# Patient Record
Sex: FEMALE | Race: WHITE | NOT HISPANIC OR LATINO | Employment: FULL TIME | URBAN - METROPOLITAN AREA
[De-identification: names, ages, dates, MRNs, and addresses within clinical notes are randomized per-mention and may not be internally consistent; named-entity substitution may affect disease eponyms.]

---

## 2023-06-16 ENCOUNTER — OFFICE VISIT (OUTPATIENT)
Dept: URGENT CARE | Facility: CLINIC | Age: 48
End: 2023-06-16
Payer: COMMERCIAL

## 2023-06-16 VITALS
BODY MASS INDEX: 24.91 KG/M2 | HEIGHT: 66 IN | DIASTOLIC BLOOD PRESSURE: 79 MMHG | SYSTOLIC BLOOD PRESSURE: 137 MMHG | RESPIRATION RATE: 18 BRPM | TEMPERATURE: 99 F | HEART RATE: 70 BPM | WEIGHT: 155 LBS | OXYGEN SATURATION: 99 %

## 2023-06-16 DIAGNOSIS — J06.9 VIRAL URI WITH COUGH: Primary | ICD-10-CM

## 2023-06-16 DIAGNOSIS — H93.8X3 SENSATION OF FULLNESS IN BOTH EARS: ICD-10-CM

## 2023-06-16 DIAGNOSIS — R09.81 NASAL CONGESTION: ICD-10-CM

## 2023-06-16 PROCEDURE — 99203 OFFICE O/P NEW LOW 30 MIN: CPT | Mod: S$GLB,,, | Performed by: NURSE PRACTITIONER

## 2023-06-16 PROCEDURE — 99203 PR OFFICE/OUTPT VISIT, NEW, LEVL III, 30-44 MIN: ICD-10-PCS | Mod: S$GLB,,, | Performed by: NURSE PRACTITIONER

## 2023-06-16 RX ORDER — IPRATROPIUM BROMIDE 21 UG/1
2 SPRAY, METERED NASAL 3 TIMES DAILY
Qty: 30 ML | Refills: 0 | Status: SHIPPED | OUTPATIENT
Start: 2023-06-16 | End: 2023-06-23

## 2023-06-16 NOTE — PROGRESS NOTES
"Subjective:      Patient ID: Solange Ferrari is a 48 y.o. female.    Vitals:  height is 5' 6" (1.676 m) and weight is 70.3 kg (155 lb). Her oral temperature is 98.5 °F (36.9 °C). Her blood pressure is 137/79 and her pulse is 70. Her respiration is 18 and oxygen saturation is 99%.     Chief Complaint: Nasal Congestion (Fever and vomiting on last Saturday. Patient has severe sinus congestion now.)    48 year old female presents for evaluation of post nasal drip and sinus pressure since Monday. Reports H/o seasonal allergies. Recent stomach virus on Saturday, resolved after 1 day. Fever, body aches, and headache on Sunday. Cold symptoms started on Monday. C/O fatigue: working 14 hours daily. Recent air travel last week and positive sick contacts at work. OTC Sudaphed with mild relief        Sinusitis  This is a new problem. The current episode started in the past 7 days. The problem has been gradually worsening since onset. The maximum temperature recorded prior to her arrival was 100.4 - 100.9 F. The fever has been present for 1 to 2 days. Her pain is at a severity of 4/10. The pain is moderate. Associated symptoms include congestion, coughing, ear pain (right ear pressure), headaches and sinus pressure. Pertinent negatives include no chills, diaphoresis, hoarse voice, neck pain, shortness of breath, sneezing, sore throat or swollen glands. Treatments tried: sudafed. The treatment provided no relief.     Constitution: Positive for fatigue. Negative for appetite change, chills, sweating and fever.   HENT:  Positive for ear pain (right ear pressure), congestion, postnasal drip and sinus pressure. Negative for sinus pain and sore throat.    Neck: neck negative. Negative for neck pain.   Cardiovascular: Negative.    Eyes: Negative.    Respiratory:  Positive for cough and sputum production. Negative for chest tightness, shortness of breath and wheezing.    Gastrointestinal:  Positive for nausea (x 1 day), vomiting (x 1 " day) and diarrhea (x 1 day).   Endocrine: negative.   Genitourinary: Negative.    Musculoskeletal:  Positive for muscle ache.   Skin: Negative.    Allergic/Immunologic: Negative.  Negative for sneezing.   Neurological:  Positive for headaches.   Hematologic/Lymphatic: Negative.    Psychiatric/Behavioral: Negative.      Objective:     Physical Exam   Constitutional: She is oriented to person, place, and time. She is cooperative.  Non-toxic appearance. She does not appear ill. No distress. normalawake  HENT:   Head: Normocephalic and atraumatic.   Ears:   Right Ear: Tympanic membrane, external ear and ear canal normal. No cerumen not present. Tympanic membrane is not injected, not scarred, not perforated, not erythematous, not retracted and not bulging. impacted cerumen  Left Ear: Tympanic membrane, external ear and ear canal normal. No cerumen not present. Tympanic membrane is not injected, not scarred, not perforated, not erythematous, not retracted and not bulging. impacted cerumen  Nose: Congestion present. No mucosal edema, rhinorrhea or purulent discharge. Right sinus exhibits no maxillary sinus tenderness and no frontal sinus tenderness. Left sinus exhibits no maxillary sinus tenderness and no frontal sinus tenderness.   Mouth/Throat: Uvula is midline, oropharynx is clear and moist and mucous membranes are normal. Mucous membranes are moist. No oropharyngeal exudate or posterior oropharyngeal erythema. Tonsils are 0 on the right. Tonsils are 0 on the left. No tonsillar exudate.   Eyes: Conjunctivae are normal. Pupils are equal, round, and reactive to light. Right eye exhibits no discharge. Left eye exhibits no discharge. No scleral icterus. Extraocular movement intact   Neck: Neck supple.   Cardiovascular: Normal rate, regular rhythm and normal heart sounds.   Pulmonary/Chest: Effort normal and breath sounds normal. No stridor. No respiratory distress. She has no decreased breath sounds. She has no wheezes. She  has no rhonchi. She has no rales. She exhibits no tenderness.   Abdominal: Normal appearance.   Musculoskeletal: Normal range of motion.         General: Normal range of motion.   Neurological: no focal deficit. She is alert and oriented to person, place, and time.   Skin: Skin is warm, dry and not diaphoretic.   Psychiatric: Her behavior is normal. Mood, judgment and thought content normal.   Nursing note and vitals reviewed.    Assessment:     1. Viral URI with cough    2. Sensation of fullness in both ears    3. Nasal congestion        Plan:     Patient presents with symptoms that are consistent with viral URI. Exam is negative for bacterial sinusitis, Otitis media, bacterial pharyngitis/tonsillitis, bronchitis, and pneumonia. Plan is to manage symptoms and prevent worsening. Discussed with patient who verbalizes understanding.   Viral URI with cough    Sensation of fullness in both ears    Nasal congestion  -     ipratropium (ATROVENT) 21 mcg (0.03 %) nasal spray; 2 sprays by Each Nostril route 3 (three) times daily. for 7 days  Dispense: 30 mL; Refill: 0                Patient Instructions   Rest  Hydration/increase fluids  Steam/hot showers  Humidifier   Ipratropium bromide 2 sprays each nostril 3 times daily as needed for nasal congestion  Claritin D OTC as needed for nasal congestion  Typical course and duration of viral illness discussed  Signs and symptoms of worsening discussed  Follow up as needed/with worsening

## 2023-06-16 NOTE — PATIENT INSTRUCTIONS
Rest  Hydration/increase fluids  Steam/hot showers  Humidifier   Ipratropium bromide 2 sprays each nostril 3 times daily as needed for nasal congestion  Claritin D OTC as needed for nasal congestion  Typical course and duration of viral illness discussed  Signs and symptoms of worsening discussed  Follow up as needed/with worsening

## 2023-06-19 ENCOUNTER — TELEPHONE (OUTPATIENT)
Dept: URGENT CARE | Facility: CLINIC | Age: 48
End: 2023-06-19
Payer: COMMERCIAL

## 2023-06-19 NOTE — TELEPHONE ENCOUNTER
Gave Ms Ferrari a call to see if she's feeling better. Patient states she is, and I told her I'm glad to hear it and if she needs anything give us a call.

## 2025-03-05 ENCOUNTER — OFFICE VISIT (OUTPATIENT)
Dept: URGENT CARE | Facility: CLINIC | Age: 50
End: 2025-03-05
Payer: COMMERCIAL

## 2025-03-05 VITALS
DIASTOLIC BLOOD PRESSURE: 93 MMHG | HEART RATE: 68 BPM | OXYGEN SATURATION: 100 % | TEMPERATURE: 98 F | SYSTOLIC BLOOD PRESSURE: 161 MMHG | RESPIRATION RATE: 18 BRPM

## 2025-03-05 DIAGNOSIS — R05.9 COUGH, UNSPECIFIED TYPE: ICD-10-CM

## 2025-03-05 DIAGNOSIS — B96.89 BACTERIAL URI: Primary | ICD-10-CM

## 2025-03-05 DIAGNOSIS — J06.9 BACTERIAL URI: Primary | ICD-10-CM

## 2025-03-05 PROCEDURE — 99214 OFFICE O/P EST MOD 30 MIN: CPT | Mod: S$GLB,,, | Performed by: PHYSICIAN ASSISTANT

## 2025-03-05 RX ORDER — BROMPHENIRAMINE MALEATE, PSEUDOEPHEDRINE HYDROCHLORIDE, AND DEXTROMETHORPHAN HYDROBROMIDE 2; 30; 10 MG/5ML; MG/5ML; MG/5ML
10 SYRUP ORAL EVERY 6 HOURS PRN
Qty: 118 ML | Refills: 0 | Status: SHIPPED | OUTPATIENT
Start: 2025-03-05 | End: 2025-03-15

## 2025-03-05 RX ORDER — AMLODIPINE BESYLATE 10 MG/1
10 TABLET ORAL
COMMUNITY
Start: 2025-01-24

## 2025-03-05 RX ORDER — METHYLPREDNISOLONE 4 MG/1
TABLET ORAL
Qty: 21 EACH | Refills: 0 | Status: SHIPPED | OUTPATIENT
Start: 2025-03-05 | End: 2025-03-26

## 2025-03-05 RX ORDER — DEXTROAMPHETAMINE SACCHARATE, AMPHETAMINE ASPARTATE MONOHYDRATE, DEXTROAMPHETAMINE SULFATE AND AMPHETAMINE SULFATE 3.75; 3.75; 3.75; 3.75 MG/1; MG/1; MG/1; MG/1
CAPSULE, EXTENDED RELEASE ORAL EVERY MORNING
COMMUNITY
Start: 2025-01-13

## 2025-03-05 RX ORDER — DOXYCYCLINE 100 MG/1
100 CAPSULE ORAL 2 TIMES DAILY
Qty: 14 CAPSULE | Refills: 0 | Status: SHIPPED | OUTPATIENT
Start: 2025-03-05 | End: 2025-03-12

## 2025-03-05 NOTE — PROGRESS NOTES
Subjective:      Patient ID: Solange Ferrari is a 49 y.o. female.    Vitals:  oral temperature is 97.5 °F (36.4 °C). Her blood pressure is 161/93 (abnormal) and her pulse is 68. Her respiration is 18 and oxygen saturation is 100%.     Chief Complaint: Nasal Congestion    C/o nasal and chest congestion, x 15 days, denies any pain, pt states she has been around others with similar symptoms, pt has taken OTC meds with no relief     Other  This is a new problem. The current episode started 1 to 4 weeks ago. The problem occurs constantly. The problem has been unchanged. Associated symptoms include congestion and coughing. Pertinent negatives include no abdominal pain, anorexia, arthralgias, change in bowel habit, chest pain, chills, diaphoresis, fatigue, fever, headaches, joint swelling, myalgias, nausea, neck pain, numbness, rash, sore throat, swollen glands, urinary symptoms, vertigo, visual change, vomiting or weakness. Nothing aggravates the symptoms. Treatments tried: OTC cough meds. The treatment provided no relief.       Constitution: Negative for chills, sweating, fatigue and fever.   HENT:  Positive for congestion. Negative for sore throat.    Neck: Negative for neck pain.   Cardiovascular:  Negative for chest pain.   Respiratory:  Positive for cough.    Gastrointestinal:  Negative for abdominal pain, nausea and vomiting.   Musculoskeletal:  Negative for joint pain, joint swelling and muscle ache.   Skin:  Negative for rash.   Neurological:  Negative for history of vertigo, headaches and numbness.      Objective:     Physical Exam   Constitutional: She is oriented to person, place, and time. She appears well-developed.   HENT:   Head: Normocephalic and atraumatic.   Ears:   Right Ear: External ear normal.   Left Ear: External ear normal.   Nose: Nose normal.   Mouth/Throat: Oropharynx is clear and moist.   Eyes: Conjunctivae, EOM and lids are normal.   Neck: Trachea normal and phonation normal. Neck supple.    Cardiovascular: Normal rate.   Pulmonary/Chest: Effort normal. No respiratory distress. She has no wheezes. She has no rhonchi.   Musculoskeletal: Normal range of motion.         General: Normal range of motion.   Neurological: She is alert and oriented to person, place, and time.   Skin: Skin is warm, dry and intact.   Psychiatric: Her speech is normal and behavior is normal. Judgment and thought content normal.   Nursing note and vitals reviewed.      Assessment:     1. Bacterial URI    2. Cough, unspecified type        Plan:   VSS. Patient non-toxic appearing. Discussed medication being prescribed.  Advised patient to follow up with PCP as needed.  Patient verbalized understanding, agrees with the plan, and is comfortable with discharge.      Bacterial URI  -     doxycycline (VIBRAMYCIN) 100 MG Cap; Take 1 capsule (100 mg total) by mouth 2 (two) times daily. for 7 days  Dispense: 14 capsule; Refill: 0  -     methylPREDNISolone (MEDROL DOSEPACK) 4 mg tablet; use as directed  Dispense: 21 each; Refill: 0  -     brompheniramine-pseudoeph-DM (BROMFED DM) 2-30-10 mg/5 mL Syrp; Take 10 mLs by mouth every 6 (six) hours as needed (cough).  Dispense: 118 mL; Refill: 0    Cough, unspecified type

## 2025-03-06 ENCOUNTER — TELEPHONE (OUTPATIENT)
Dept: URGENT CARE | Facility: CLINIC | Age: 50
End: 2025-03-06
Payer: COMMERCIAL

## 2025-03-17 ENCOUNTER — OFFICE VISIT (OUTPATIENT)
Dept: URGENT CARE | Facility: CLINIC | Age: 50
End: 2025-03-17
Payer: COMMERCIAL

## 2025-03-17 VITALS
OXYGEN SATURATION: 98 % | HEART RATE: 74 BPM | BODY MASS INDEX: 28.27 KG/M2 | TEMPERATURE: 98 F | RESPIRATION RATE: 16 BRPM | HEIGHT: 66 IN | SYSTOLIC BLOOD PRESSURE: 138 MMHG | DIASTOLIC BLOOD PRESSURE: 88 MMHG | WEIGHT: 175.94 LBS

## 2025-03-17 DIAGNOSIS — J06.9 BACTERIAL URI: Primary | ICD-10-CM

## 2025-03-17 DIAGNOSIS — B96.89 BACTERIAL URI: Primary | ICD-10-CM

## 2025-03-17 DIAGNOSIS — R05.9 COUGH, UNSPECIFIED TYPE: ICD-10-CM

## 2025-03-17 PROCEDURE — 99214 OFFICE O/P EST MOD 30 MIN: CPT | Mod: S$GLB,,, | Performed by: PHYSICIAN ASSISTANT

## 2025-03-17 RX ORDER — AZITHROMYCIN 250 MG/1
TABLET, FILM COATED ORAL
Qty: 6 TABLET | Refills: 0 | Status: SHIPPED | OUTPATIENT
Start: 2025-03-17 | End: 2025-03-22

## 2025-03-17 NOTE — PROGRESS NOTES
"Subjective:      Patient ID: Solange Ferrari is a 49 y.o. female.    Vitals:  height is 5' 6" (1.676 m) and weight is 79.8 kg (175 lb 14.8 oz). Her tympanic temperature is 97.5 °F (36.4 °C). Her blood pressure is 138/88 and her pulse is 74. Her respiration is 16 and oxygen saturation is 98%.     Chief Complaint: Cough    49 year old female presents today c/o continued sinus congestion/pressure, cough, and headaches that began 2/15.  Patient was seen here on 3/15 and diagnosed with bacterial URI. She reports that she did not finish the antibiotics.  Reports worsening of symptoms.    Cough  This is a new problem. The current episode started 1 to 4 weeks ago. The problem occurs constantly. The cough is Non-productive. Associated symptoms include ear congestion, headaches, postnasal drip, shortness of breath and wheezing. Pertinent negatives include no chest pain, chills, ear pain, fever, heartburn, hemoptysis, myalgias, nasal congestion, rash, rhinorrhea, sore throat, sweats or weight loss. Exacerbated by: talking. She has tried prescription cough suppressant for the symptoms. The treatment provided mild relief. There is no history of asthma, bronchiectasis, bronchitis, COPD, emphysema, environmental allergies or pneumonia.       Constitution: Negative for chills and fever.   HENT:  Positive for postnasal drip. Negative for ear pain and sore throat.    Cardiovascular:  Negative for chest pain.   Respiratory:  Positive for cough, shortness of breath and wheezing. Negative for bloody sputum.    Gastrointestinal:  Negative for heartburn.   Musculoskeletal:  Negative for muscle ache.   Skin:  Negative for rash.   Allergic/Immunologic: Negative for environmental allergies.   Neurological:  Positive for headaches.      Objective:     Physical Exam   Constitutional: She is oriented to person, place, and time. She appears well-developed.   HENT:   Head: Normocephalic and atraumatic.   Ears:   Right Ear: External ear normal. "   Left Ear: External ear normal.   Nose: Congestion present.   Mouth/Throat: Oropharynx is clear and moist.   Eyes: Conjunctivae, EOM and lids are normal.   Neck: Trachea normal and phonation normal. Neck supple.   Cardiovascular: Normal rate.   Pulmonary/Chest: Effort normal. No respiratory distress. She has no wheezes. She has no rhonchi.   Musculoskeletal: Normal range of motion.         General: Normal range of motion.   Neurological: She is alert and oriented to person, place, and time.   Skin: Skin is warm, dry and intact.   Psychiatric: Her speech is normal and behavior is normal. Judgment and thought content normal.   Nursing note and vitals reviewed.      Assessment:     1. Bacterial URI    2. Cough, unspecified type        Plan:   VSS. Patient non-toxic appearing. Discussed medication being prescribed.  Advised patient to follow up with PCP as needed.  Patient verbalized understanding, agrees with the plan, and is comfortable with discharge.      Bacterial URI  -     azithromycin (Z-JUAN) 250 MG tablet; Take 2 tablets by mouth on day 1; Take 1 tablet by mouth on days 2-5  Dispense: 6 tablet; Refill: 0    Cough, unspecified type

## 2025-04-24 ENCOUNTER — OFFICE VISIT (OUTPATIENT)
Dept: PRIMARY CARE CLINIC | Facility: CLINIC | Age: 50
End: 2025-04-24
Payer: COMMERCIAL

## 2025-04-24 ENCOUNTER — RESULTS FOLLOW-UP (OUTPATIENT)
Dept: PRIMARY CARE CLINIC | Facility: CLINIC | Age: 50
End: 2025-04-24

## 2025-04-24 ENCOUNTER — LAB VISIT (OUTPATIENT)
Dept: LAB | Facility: HOSPITAL | Age: 50
End: 2025-04-24
Attending: FAMILY MEDICINE
Payer: COMMERCIAL

## 2025-04-24 VITALS
HEART RATE: 73 BPM | TEMPERATURE: 97 F | OXYGEN SATURATION: 95 % | DIASTOLIC BLOOD PRESSURE: 70 MMHG | HEIGHT: 66 IN | WEIGHT: 178.81 LBS | BODY MASS INDEX: 28.74 KG/M2 | RESPIRATION RATE: 18 BRPM | SYSTOLIC BLOOD PRESSURE: 108 MMHG

## 2025-04-24 DIAGNOSIS — R30.0 DYSURIA: ICD-10-CM

## 2025-04-24 DIAGNOSIS — I10 HYPERTENSION, UNSPECIFIED TYPE: ICD-10-CM

## 2025-04-24 DIAGNOSIS — Z79.890 HORMONE REPLACEMENT THERAPY (HRT): ICD-10-CM

## 2025-04-24 DIAGNOSIS — Z00.00 ROUTINE GENERAL MEDICAL EXAMINATION AT A HEALTH CARE FACILITY: ICD-10-CM

## 2025-04-24 DIAGNOSIS — R14.0 ABDOMINAL BLOATING: ICD-10-CM

## 2025-04-24 DIAGNOSIS — Z76.89 ENCOUNTER TO ESTABLISH CARE: Primary | ICD-10-CM

## 2025-04-24 DIAGNOSIS — R63.5 ABNORMAL WEIGHT GAIN: ICD-10-CM

## 2025-04-24 PROBLEM — F41.1 GAD (GENERALIZED ANXIETY DISORDER): Status: ACTIVE | Noted: 2025-04-24

## 2025-04-24 LAB
ALBUMIN SERPL BCP-MCNC: 3.9 G/DL (ref 3.5–5.2)
ALP SERPL-CCNC: 74 UNIT/L (ref 40–150)
ALT SERPL W/O P-5'-P-CCNC: 18 UNIT/L (ref 10–44)
ANION GAP (OHS): 7 MMOL/L (ref 8–16)
AST SERPL-CCNC: 22 UNIT/L (ref 11–45)
BILIRUB SERPL-MCNC: 0.4 MG/DL (ref 0.1–1)
BILIRUB SERPL-MCNC: NORMAL MG/DL
BLOOD URINE, POC: NORMAL
BUN SERPL-MCNC: 11 MG/DL (ref 6–20)
CALCIUM SERPL-MCNC: 9.2 MG/DL (ref 8.7–10.5)
CHLORIDE SERPL-SCNC: 105 MMOL/L (ref 95–110)
CHOLEST SERPL-MCNC: 200 MG/DL (ref 120–199)
CHOLEST/HDLC SERPL: 3.1 {RATIO} (ref 2–5)
CLARITY, POC UA: NORMAL
CO2 SERPL-SCNC: 26 MMOL/L (ref 23–29)
COLOR, POC UA: YELLOW
CREAT SERPL-MCNC: 0.9 MG/DL (ref 0.5–1.4)
ERYTHROCYTE [DISTWIDTH] IN BLOOD BY AUTOMATED COUNT: 13.8 % (ref 11.5–14.5)
GFR SERPLBLD CREATININE-BSD FMLA CKD-EPI: >60 ML/MIN/1.73/M2
GLUCOSE SERPL-MCNC: 83 MG/DL (ref 70–110)
GLUCOSE UR QL STRIP: NORMAL
HCT VFR BLD AUTO: 44.8 % (ref 37–48.5)
HDLC SERPL-MCNC: 65 MG/DL (ref 40–75)
HDLC SERPL: 32.5 % (ref 20–50)
HGB BLD-MCNC: 14.6 GM/DL (ref 12–16)
KETONES UR QL STRIP: NORMAL
LDLC SERPL CALC-MCNC: 124.6 MG/DL (ref 63–159)
LEUKOCYTE ESTERASE URINE, POC: NORMAL
MCH RBC QN AUTO: 30.9 PG (ref 27–31)
MCHC RBC AUTO-ENTMCNC: 32.6 G/DL (ref 32–36)
MCV RBC AUTO: 95 FL (ref 82–98)
NITRITE, POC UA: NORMAL
NONHDLC SERPL-MCNC: 135 MG/DL
PH, POC UA: 5.5
PLATELET # BLD AUTO: 325 K/UL (ref 150–450)
PMV BLD AUTO: 10.3 FL (ref 9.2–12.9)
POTASSIUM SERPL-SCNC: 4.6 MMOL/L (ref 3.5–5.1)
PROT SERPL-MCNC: 7.3 GM/DL (ref 6–8.4)
PROTEIN, POC: NORMAL
RBC # BLD AUTO: 4.73 M/UL (ref 4–5.4)
SODIUM SERPL-SCNC: 138 MMOL/L (ref 136–145)
SPECIFIC GRAVITY, POC UA: 1.02
TRIGL SERPL-MCNC: 52 MG/DL (ref 30–150)
UROBILINOGEN, POC UA: 0.2
WBC # BLD AUTO: 5.02 K/UL (ref 3.9–12.7)

## 2025-04-24 PROCEDURE — 85027 COMPLETE CBC AUTOMATED: CPT

## 2025-04-24 PROCEDURE — 82465 ASSAY BLD/SERUM CHOLESTEROL: CPT

## 2025-04-24 PROCEDURE — 82040 ASSAY OF SERUM ALBUMIN: CPT

## 2025-04-24 PROCEDURE — 36415 COLL VENOUS BLD VENIPUNCTURE: CPT | Mod: PN

## 2025-04-24 PROCEDURE — 99999 PR PBB SHADOW E&M-EST. PATIENT-LVL IV: CPT | Mod: PBBFAC,,, | Performed by: FAMILY MEDICINE

## 2025-04-24 PROCEDURE — 87086 URINE CULTURE/COLONY COUNT: CPT

## 2025-04-24 RX ORDER — ASPIRIN 81 MG/1
81 TABLET ORAL DAILY
COMMUNITY

## 2025-04-24 RX ORDER — FLUCONAZOLE 150 MG/1
150 TABLET ORAL
Qty: 2 TABLET | Refills: 0 | Status: SHIPPED | OUTPATIENT
Start: 2025-04-24

## 2025-04-24 RX ORDER — AMLODIPINE BESYLATE 5 MG/1
5-10 TABLET ORAL DAILY
Qty: 180 TABLET | Refills: 3 | Status: SHIPPED | OUTPATIENT
Start: 2025-04-24 | End: 2025-04-25

## 2025-04-24 NOTE — TELEPHONE ENCOUNTER
Refill Routing Note   Medication(s) are not appropriate for processing by [facility] for the following reason(s):        Med affordability    [facility] action(s):  Defer  Route        Medication Therapy Plan: Self-adjusting directions outside [facility] protocol.      Appointments  past 12m or future 3m with PCP    Date Provider   Last Visit   4/24/2025 Wanda Triplett MD   Next Visit   5/29/2025 Wanda Triplett MD   ED visits in past 90 days: 0        Note composed:12:00 PM 04/24/2025

## 2025-04-24 NOTE — TELEPHONE ENCOUNTER
No care due was identified.  [facility] Embedded Care Due Messages. Reference number: [id number].   4/24/2025 9:02:58 AM CDT

## 2025-04-24 NOTE — PROGRESS NOTES
Chief Complaint  Chief Complaint   Patient presents with    [facility]   New patient to me and to [facility].   Pt has seen Barbara Sharpe NP outside of [facility] and was prescribed Adderall in 12/2024-2/2025.  reviewed.     HPI  Solange Ferrari is a 50 y.o. female with multiple medical diagnoses as listed in the medical history and problem list that presents for  in person visit.     History of Present Illness    CHIEF COMPLAINT:  - Patient presents to establish care with a new PCP and to address ongoing menopausal symptoms, weight management concerns, and recent illness.    HPI:  Patient is a 49-year-old woman presenting to establish care with a new PCP. She reports a complex medical history related to menopause and hormone replacement therapy (HRT). Her primary menopausal symptom is insomnia, which she attributes to a lack of progesterone and estrogen. She is currently on HRT, including estrogen and testosterone pellets, and progesterone tablets or cream. She reports significant improvement on this regimen, noting better sleep and an overall sense of normalcy.    She has been struggling with weight management. Her weight has increased from a consistent 148-155 lbs for many years to her current weight of 178 lbs. Weight training and walking have not resulted in weight loss, with recent fluctuations up to 182 lbs.    She reports a recent 8-9 week illness, which she describes as severe and atypical. Symptoms included a persistent spiral cough and a lump in her lymph nodes. She required three rounds of antibiotics before recovering. She still feels tired, which she attributes to not having her progesterone medication.    She reports brain fog and difficulty focusing, for which she was prescribed Adderall in December. She takes this medication intermittently, not daily.    She has had intermittent high blood pressure in the past, leading to her current prescription of amlodipine. She reports feeling  lethargic on the 10mg dose and prefers a lower dose.    She notes ongoing urinary tract issues since menopause, stating she feels at risk for a UTI currently and often has yeast infections following antibiotic use.    She denies current symptoms of allergies such as sneezing or watery eyes, asthma, or current breathing issues.    MEDICATIONS:  - Amlodipine 10 mg daily for blood pressure  - Adderall 15 mg, not taken daily, last refilled March 3rd  - Progesterone (bioidentical) for hormone replacement therapy  - Testosterone pellets, 50 mg (estimated), for hormone replacement therapy  - Estrogen pellets for hormone replacement therapy  - Baby aspirin daily, to prevent blood thickening from testosterone therapy  - Vitamin D  - Vitamin K  - Magnesium threonate  - Omega 3  - Probiotic  - Plexus drink (contains B vitamins)  - Valtrex for genital herpes (taken infrequently, about once a year or every couple of years)  - Amlodipine increased from 5 mg to 10 mg by Barbara (nurse practitioner)  - Adderall decreased from 20 mg to 15 mg  - Discontinued Cytomel (thyroid medication)  - Discontinued amlodipine 5 mg due to feeling horrible and having very low blood pressure    PMH:  - Hypertension  - Genital herpes  - High cholesterol  - Menopause: Yes  - Contraception: current hormone replacement therapy, including estrogen and testosterone pellets and progesterone tablets/cream/oral pills    FAMILY HISTORY:  - Mother: Thyroid issues  - Sister: Thyroid issues    SOCIAL HISTORY:    - Occupation:  of a construction company with 7,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 Other/Annual   Have you experienced any of the following:   Change in activity? No   Unexpected weight change? No   Neck pain? No   Hearing loss? No   Runny nose? No   Trouble swallowing? No   Eye discharge? No   Changes in vision? No   Chest tightness? No   Wheezing? No    Chest pain? No   Heart beating fast or racing? No   Blood in stool? No   Constipation? No   Vomiting? No   Diarrhea? No   Drinking much more than usual? No   Urinating much more than usual? No   Difficulty urinating? No   Blood in the urine? No   Menstrual problem? No   Painful urination? No   Joint swelling? No   Joint pain? No   Headaches? No   Weakness? No   Confusion? No   Feeling depressed? No     [facility]    4/24/2025  7:15 AM CDT - Filed by Patient   This questionnaire should take approximately 5 to 10 minutes to complete.  To begin, press Let's Begin and then press Continue. Let's Begin   On average, how many days per week do you engage in moderate to strenuous exercise (like a brisk walk)? 2 days   On average, how many minutes do you engage in exercise at this level? 40 min   Do you feel stress - tense, restless, nervous, or anxious, or unable to sleep at night because your mind is troubled all the time - these days? To some extent   How often do you feel lonely or isolated from those around you? Rarely   How often do you need to have someone help you when you read instructions, pamphlets, or other written material from your doctor or pharmacy? Never   How hard is it for you to pay for the very basics like food, housing, medical care, and heating? Not hard at all   In the past 12 months has the electric, gas, oil, or water company threatened to shut off services in your home? No   Within the past 12 months, you worried that your food would run out before you got the money to buy more. Never true   Within the past 12 months, the food you bought just didn't last and you didn't have money to get more. Never true   In the past 12 months, has lack of transportation kept you from medical appointments or from getting medications? No   In the past 12 months, has lack of transportation kept you from meetings, work, or from getting things needed for daily living? No   In the last 12 months, was there a time  "when you were not able to pay the mortgage or rent on time? No   In the past 12 months, how many times have you moved where you were living? 0   At any time in the past 12 months, were you homeless or living in a shelter (including now)? No   How often do you have a drink containing alcohol? 2-4 times a month   How many drinks containing alcohol do you have on a typical day when you are drinking? 1 or 2   How often do you have six or more drinks on one occasion? Never            Pmh, Psh, Family Hx, Social Hx, HM updated in Epic Tabs today.    Review of Systems   Constitutional:  Positive for activity change, appetite change and fatigue. Negative for unexpected weight change.   HENT:  Negative for hearing loss, rhinorrhea and trouble swallowing.    Eyes:  Negative for discharge and visual disturbance.   Respiratory:  Negative for chest tightness and wheezing.    Cardiovascular:  Negative for chest pain and palpitations.   Gastrointestinal:  Positive for abdominal distention and abdominal pain. Negative for blood in stool, constipation, diarrhea and vomiting.   Endocrine: Positive for heat intolerance. Negative for polydipsia and polyuria.   Genitourinary:  Negative for difficulty urinating, dysuria, hematuria and menstrual problem.   Musculoskeletal:  Negative for arthralgias, joint swelling and neck pain.   Neurological:  Negative for weakness and headaches.   Psychiatric/Behavioral:  Positive for sleep disturbance. Negative for confusion and dysphoric mood.         Objective:     Vitals:    04/24/25 0809   BP: 108/70   BP Location: Right arm   Patient Position: Sitting   Pulse: 73   Resp: 18   Temp: 97.3 °F (36.3 °C)   TempSrc: Tympanic   SpO2: 95%   Weight: 81.1 kg (178 lb 12.7 oz)   Height: 5' 6" (1.676 m)     Wt Readings from Last 10 Encounters:   04/24/25 81.1 kg (178 lb 12.7 oz)   04/15/25 80.7 kg (178 lb)   03/17/25 79.8 kg (175 lb 14.8 oz)   08/29/24 79.9 kg (176 lb 3.2 oz)   08/22/23 70.3 kg (155 lb) "   06/16/23 70.3 kg (155 lb)   11/09/22 73 kg (161 lb)   08/15/22 75.8 kg (167 lb)     Physical Exam    Lungs: Normal lung sounds.  TEST RESULTS:  - Cholesterol: Checked before pellet insertion 4-5 weeks ago, high overall cholesterol but also high HDL  - Thyroid: Checked by Barbara (nurse practitioner) recently  - Hormone levels: Checked by Barbara before last pellet insertion about 4-5 weeks ago  - Blood counts: Checked previously  - Kidney and liver tests: Checked previously       Physical Exam  Vitals reviewed.   Constitutional:       Appearance: Normal appearance. She is well-developed, well-groomed and overweight.   HENT:      Head: Normocephalic and atraumatic.      Right Ear: Tympanic membrane and external ear normal.      Left Ear: Tympanic membrane and external ear normal.      Nose: Nose normal.      Mouth/Throat:      Mouth: Mucous membranes are moist.      Pharynx: Oropharynx is clear.   Eyes:      Conjunctiva/sclera: Conjunctivae normal.      Pupils: Pupils are equal, round, and reactive to light.   Neck:      Thyroid: No thyromegaly.   Cardiovascular:      Rate and Rhythm: Normal rate and regular rhythm.      Heart sounds: Normal heart sounds. No murmur heard.     No friction rub. No gallop.   Pulmonary:      Effort: Pulmonary effort is normal. No respiratory distress.      Breath sounds: Normal breath sounds. No wheezing or rales.   Abdominal:      General: Bowel sounds are normal. There is no distension.      Palpations: Abdomen is soft.      Tenderness: There is no abdominal tenderness. There is no rebound.   Musculoskeletal:         General: Normal range of motion.      Cervical back: Normal range of motion and neck supple.   Lymphadenopathy:      Cervical: No cervical adenopathy.   Skin:     General: Skin is warm and dry.      Findings: No rash.   Neurological:      Mental Status: She is alert and oriented to person, place, and time.   Psychiatric:         Attention and Perception: Attention and  "perception normal.         Mood and Affect: Mood and affect normal.         Speech: Speech normal.         Behavior: Behavior normal. Behavior is cooperative.         Thought Content: Thought content normal.         Cognition and Memory: Cognition and memory normal.         Judgment: Judgment normal.         Assessment:   LABS:   No results found for: "HGBA1C"   No results found for: "CHOL"  No results found for: "LDLCALC"  No results found for: "WBC", "HGB", "HCT", "PLT", "CHOL", "TRIG", "HDL", "LDLDIRECT", "ALT", "AST", "NA", "K", "CL", "CREATININE", "BUN", "CO2", "TSH", "PSA", "INR", "GLUF", "HGBA1C", "MICROALBUR"    Plan:   1. Encounter to establish care    2. Abnormal weight gain    3. Hypertension, unspecified type  -     amLODIPine (NORVASC) 5 MG tablet; Take 1-2 tablets (5-10 mg total) by mouth once daily.  Dispense: 180 tablet; Refill: 3  -     Comprehensive Metabolic Panel; Future; Expected date: 04/24/2025    4. Dysuria  -     POCT urine dipstick without microscope  -     Urine culture; Future; Expected date: 04/24/2025  -     fluconazole (DIFLUCAN) 150 MG Tab; Take 1 tablet (150 mg total) by mouth every 72 hours.  Dispense: 2 tablet; Refill: 0    5. Hormone replacement therapy (HRT)  -     CBC Without Differential; Future; Expected date: 04/24/2025  -     Comprehensive Metabolic Panel; Future; Expected date: 04/24/2025  -     Lipid Panel; Future; Expected date: 04/24/2025    6. Routine general medical examination at a health care facility  -     CBC Without Differential; Future; Expected date: 04/24/2025  -     Comprehensive Metabolic Panel; Future; Expected date: 04/24/2025  -     Lipid Panel; Future; Expected date: 04/24/2025    7. Abdominal bloating      Assessment & Plan    Z79.890 Hormone replacement therapy (HRT)  Z76.89 Encounter to establish care  I10 Hypertension, unspecified type  R30.0 Dysuria  Z00.00 Routine general medical exam at a health care facility    IMPRESSION:  Reviewed complex " medical history, including recent respiratory illness, menopausal symptoms, and current medication regimen.  Considered potential overmedication and need for comprehensive reevaluation of health status.  Assessed BP management, noting reports of feeling sluggish on current dose of amlodipine 10 mg.  Evaluated use of hormone replacement therapy and its impact on symptoms.  Considered risks and benefits of testosterone therapy.  Assessed appropriateness of stimulant therapy in context of menopausal symptoms and BP concerns.  Plan to review recent lab results from previous provider.    PLAN SUMMARY:  - Send recent lab results from previous provider for review  - Start Diflucan for potential yeast infection  - Decrease amlodipine from 10 mg to 5 mg; take 1-1.5 tablets daily  - Order CBC, CMP, lipid panel, and urinalysis with culture and sensitivity  - Follow up in 1 month to reassess BP management and overall health status    HORMONE REPLACEMENT THERAPY (HRT):  - Discussed testosterone therapy effects, including potential benefits (increased muscle mass, improved sex drive) and risks (blood thickening, unwanted hair growth, urethral discomfort).  - Explained the relationship between hormone levels, sleep, and overall well-being.    ENCOUNTER TO ESTABLISH CARE:  - Send recent lab results from previous provider through patient portal for review.  - Ordered CBC, CMP, lipid panel, and urinalysis with culture and sensitivity.  - Follow up in 1 month to reassess BP management and overall health status.    HYPERTENSION, UNSPECIFIED TYPE:  - Explained the silent nature of HTN and importance of regular BP monitoring.  - Patient to check BP at home 2-3 times per week (once mid-week and once on weekend) and record readings to identify trends and inform treatment decisions.  - Decreased amlodipine from 10 mg to 5 mg; take 1-1.5 tablets daily.    DYSURIA:  - Started Diflucan for potential yeast infection.  - Take 1 dose now and a  2nd dose if symptoms persist.  - Contact the office if urinary symptoms worsen or persist after Diflucan treatment.    LIFESTYLE CHANGES:  - Discussed weight management challenges during menopause and emphasized importance of consistent diet and exercise.  - Patient to maintain current weight training routine and consider increasing walking frequency.           US Breast Left Limited  Narrative: Result:   US Breast Left Limited     History:  Patient is 49 y.o. and is seen for a palpable area of concern in the left   axilla.     Films Compared:  Compared to: 08/29/2024 Mammo Digital Screening Bilat w/ Naun, 08/22/2023   Mammo Digital Screening Bilat w/ Naun, and 11/09/2022 US Breast Left   Complete     Findings:    There is no evidence of suspicious masses.    Limited Breast ultrasound was performed. Ultrasound evaluation   demonstrates no suspicious abnormality.   Impression: No sonographic evidence of malignancy.     BI-RADS Category 1: Negative Finding     Recommendation:  Routine screening mammogram in 1 year is recommended.    The ASCVD Risk score (Pauline DK, et al., 2019) failed to calculate for the following reasons:    Cannot find a previous HDL lab    Cannot find a previous total cholesterol lab    Follow-up: Follow up in about 1 month (around 5/24/2025) for f/u VV Dr. Triplett- 1 mo f/u labs,.    I spent a total of    60   minutes face to face and non-face to face on the date of this visit.This includes time preparing to see the patient (eg, review of tests, notes), obtaining and/or reviewing additional history from an independent historian and/or outside medical records, documenting clinical information in the electronic health record, independently interpreting results and/or communicating results to the patient/family/caregiver, or care coordinator.  Visit today included increased complexity associated with the care of the episodic problem addressed and managing the longitudinal care of the patient due to  the serious and/or complex managed problem(s).    This note was generated with the assistance of ambient listening technology. Verbal consent was obtained by the patient and accompanying visitor(s) for the recording of patient appointment to facilitate this note. I attest to having reviewed and edited the generated note for accuracy, though some syntax or spelling errors may persist. Please contact the author of this note for any clarification.       There are no Patient Instructions on file for this visit.

## 2025-04-25 RX ORDER — AMLODIPINE BESYLATE 5 MG/1
TABLET ORAL
Qty: 90 TABLET | Refills: 3 | Status: SHIPPED | OUTPATIENT
Start: 2025-04-25

## 2025-04-25 RX ORDER — AMLODIPINE BESYLATE 2.5 MG/1
TABLET ORAL
Qty: 90 TABLET | Refills: 3 | Status: SHIPPED | OUTPATIENT
Start: 2025-04-25

## 2025-04-26 LAB — BACTERIA UR CULT: NO GROWTH
